# Patient Record
Sex: MALE | NOT HISPANIC OR LATINO | Employment: UNEMPLOYED | URBAN - METROPOLITAN AREA
[De-identification: names, ages, dates, MRNs, and addresses within clinical notes are randomized per-mention and may not be internally consistent; named-entity substitution may affect disease eponyms.]

---

## 2018-10-27 ENCOUNTER — HOSPITAL ENCOUNTER (EMERGENCY)
Facility: CLINIC | Age: 28
Discharge: HOME | End: 2018-10-27
Attending: EMERGENCY MEDICINE | Admitting: EMERGENCY MEDICINE
Payer: COMMERCIAL

## 2018-10-27 VITALS
RESPIRATION RATE: 18 BRPM | DIASTOLIC BLOOD PRESSURE: 117 MMHG | OXYGEN SATURATION: 97 % | SYSTOLIC BLOOD PRESSURE: 160 MMHG

## 2018-10-27 DIAGNOSIS — F19.10 SUBSTANCE ABUSE (H): ICD-10-CM

## 2018-10-27 DIAGNOSIS — F43.22 ADJUSTMENT DISORDER WITH ANXIOUS MOOD: ICD-10-CM

## 2018-10-27 PROCEDURE — 99285 EMERGENCY DEPT VISIT HI MDM: CPT | Mod: 25

## 2018-10-27 PROCEDURE — 90791 PSYCH DIAGNOSTIC EVALUATION: CPT

## 2018-10-27 ASSESSMENT — ENCOUNTER SYMPTOMS: HALLUCINATIONS: 0

## 2018-10-27 NOTE — ED AVS SNAPSHOT
Emergency Department    [address] [facility] [address]    Phone:  [phone]    Fax:  [phone]                                       Rl Tubbs   MRN: [id number]    Department:   Emergency Department   Date of Visit:  10/27/2018           Patient Information     Date Of Birth          1990        Your diagnoses for this visit were:     Adjustment disorder with anxious mood     Substance abuse (H)        You were seen by Miguel Mattson MD.      Follow-up Information     Follow up with  Emergency Department.    Specialty:  EMERGENCY MEDICINE    Why:  As needed    Contact information:    [address] [facility] [address] [phone]        Follow up with Primary Care In 3 days.        Discharge Instructions         Adjustment Disorder  Life changes--work, family, parents, children--each can cause a great deal of stress in life. An adjustment disorder means you have trouble dealing with this change and stress. This problem can have serious results. You may feel helpless, depressed, make bad decisions, or even feel like you want to hurt yourself.  Adjustment disorder can cause anxiety or depression. It is triggered by daily stresses such as:    Death of a loved one    Divorce    Marriage    General life changes such as changing or leaving a job    Moving    Illness or other health issue for you or a family member    Sex    Money     Symptoms may include:    Sadness or crying    Anxiety    Insomnia    Poor concentration    Trouble doing simple things    New problems at work or with family or friends    Loss of self-esteem    Sense of hopelessness    Feeling trapped or cut off from others  With this condition, it is common to feel sad, guilty, hopeless, and restless. These feelings may continue for weeks or months. It can be helpful to identify what is causing the additional stress and take steps to get extra support. If new stressful events do not  happen, it is likely that you will gradually start feeling better.  Home care    If you have been given a prescription for medicine, take it as directed.    It helps to talk about your feelings and thoughts with family or friends who understand and support you.  Follow-up care  Follow up with your healthcare provider, or therapist as advised. Let them know if this condition does not improve or gets worse.  When to seek medical advice  Call your healthcare provider right away if any of these happen:    Worsening depression or anxiety    Feeling out of control    Thoughts of harming yourself or another    Being unable to care for yourself  Date Last Reviewed: 10/1/2017    6089-7158 [employer]. [address] [place] [address]. All rights reserved. This information is not intended as a substitute for professional medical care. Always follow your healthcare professional's instructions.        Drug Abuse  Use and abuse of drugs like marijuana, amphetamines (speed, crank), cocaine, heroin or prescription pain medicines, sedatives and sleeping pills, MDMA, ecstasy, bath salts, PCP, mescaline and LSD may lead to addiction or dependence. Once this happens, you are at greater risk for any of the following:  Social and personal problems    Craving for the drug and not able to stop using even though you think you want to stop (psychological addiction)    Drug withdrawal symptoms if you stop taking the drug (physical dependence)    Loss of job or your family    Arrest, conviction, and [employer] sentence for possession of an illegal substance or for driving under the influence  Health problems    Strokes, heart attacks, kidney failure    Accidental injuries to yourself or others while you are under the influence of the drug (in a car or at home)    HIV infection (much greater risk if you use IV drugs)    Skin infections    Other sexually transmitted disease (STDs such as herpes, chlamydia, gonorrhea, and  others)    Severe and fatal infection of the heart valves (if you use IV drugs)    Hepatitis B or C    Death from overdose  Home care  The following suggestions can help you care for yourself at home:    Admit you have a drug problem. Ask for help from your family and close friends.    Seek professional help. This could be in the form of individual psychotherapy or counseling. There are also outpatient, inpatient, and residential drug treatment programs.    Join a self-help group for drug abuse.    Stay away from friends who abuse drugs or tempt you to continue abusing drugs.    Eat a balanced diet and start a regular exercise program.  Follow-up care  Follow up with your healthcare provider, or as advised. Contact one of the resources below for help:    National [place] on Alcoholism and Drug Dependence  www.ncadd.org  817.386.8680    Narcotics Anonymous  www.na.org  189.764.9155    National Alcohol and Substance Abuse Information Center (for referral to treatment programs)  www.[employer]  191.202.7398  Call 911  Call 911 if any of the following occur:    Seizure    Hard time breathing or slow, irregular breathing    Chest pain    Sudden weakness on one side of your body or sudden trouble speaking    Very drowsy or trouble awakening    Fainting or loss of consciousness    Rapid heart rate    Very slow heart rate  When to seek medical advice  Call your healthcare provider right away if any of these occur:    Agitation, anxiety, unable to sleep    Unintended weight loss (more than 10 to 15 pounds over 3 months)    Fever of 100.4 F (38 C) or higher, or as directed by your healthcare provider    Shortness of breath    Cough with colored sputum    Redness, swelling, or tenderness at an injection site  Date Last Reviewed: 6/1/2016 2000-2017 [employer]. [address] [place] [address]. All rights reserved. This information is not intended as a substitute for professional medical  care. Always follow your healthcare professional's instructions.            24 Hour Appointment Hotline       To make an appointment at any [facility], call [phone] ([phone]). If you don't have a family doctor or clinic, we will help you find one. [facility] clinics are conveniently located to serve the needs of you and your family.             Review of your medicines      Our records show that you are taking the medicines listed below. If these are incorrect, please call your family doctor or clinic.        Dose / Directions Last dose taken    NO ACTIVE MEDICATIONS        .   Refills:  0        SEROQUEL PO        1 TABLET TWICE DAILY   Refills:  0                Orders Needing Specimen Collection     Ordered          10/27/18 2100  Drug screen urine - ROUTINE, Prio: Routine, Needs to be Collected     Scheduled Task Status   10/27/18 2101 Collect Drug screen urine Open   Order Class:  PCU Collect                  Pending Results     No orders found from 10/25/2018 to 10/28/2018.            Pending Culture Results     No orders found from 10/25/2018 to 10/28/2018.            Pending Results Instructions     If you had any lab results that were not finalized at the time of your Discharge, you can call the ED Lab Result RN at [phone]. You will be contacted by this team for any positive Lab results or changes in treatment. The nurses are available 7 days a week from 10A to 6:30P.  You can leave a message 24 hours per day and they will return your call.        Test Results From Your Hospital Stay               Clinical Quality Measure: Blood Pressure Screening     Your blood pressure was checked while you were in the emergency department today. The last reading we obtained was  BP: (!) 160/117 . Please read the guidelines below about what these numbers mean and what you should do about them.  If your systolic blood pressure (the top number) is less than 120 and your diastolic blood pressure (the  "bottom number) is less than 80, then your blood pressure is normal. There is nothing more that you need to do about it.  If your systolic blood pressure (the top number) is 120-139 or your diastolic blood pressure (the bottom number) is 80-89, your blood pressure may be higher than it should be. You should have your blood pressure rechecked within a year by a primary care provider.  If your systolic blood pressure (the top number) is 140 or greater or your diastolic blood pressure (the bottom number) is 90 or greater, you may have high blood pressure. High blood pressure is treatable, but if left untreated over time it can put you at risk for heart attack, stroke, or kidney failure. You should have your blood pressure rechecked by a primary care provider within the next 4 weeks.  If your provider in the emergency department today gave you specific instructions to follow-up with your doctor or provider even sooner than that, you should follow that instruction and not wait for up to 4 weeks for your follow-up visit.        Thank you for choosing [facility]       Thank you for choosing [facility] for your care. Our goal is always to provide you with excellent care. Hearing back from our patients is one way we can continue to improve our services. Please take a few minutes to complete the written survey that you may receive in the mail after you visit with us. Thank you!        [facility] Information     [employer] lets you send messages to your doctor, view your test results, renew your prescriptions, schedule appointments and more. To sign up, go to [email or web address] . Click on \"Log in\" on the left side of the screen, which will take you to the Welcome page. Then click on \"Sign up Now\" on the right side of the page.     You will be asked to enter the access code listed below, as well as some personal information. Please follow the directions to create your username and password.     Your access code is: " [email or web address]  Expires: 2019  9:25 PM     Your access code will  in 90 days. If you need help or a new code, please call your [facility] clinic or [phone].        Care EveryWhere ID     This is your Care EveryWhere ID. This could be used by other organizations to access your [facility] medical records  [id number]        Equal Access to Services     [facility]: Hadii joseph Garcia, waaxda lureeadaha, qaybta kaalmada stephanie, michelle duron . So [facility] [phone].    ATENCIÓN: Si habla español, tiene a charlton disposición servicios gratuitos de asistencia lingüística. Llame al [phone].    We comply with applicable federal civil rights laws and [place] laws. We do not discriminate on the basis of race, color, national origin, age, disability, sex, sexual orientation, or gender identity.            After Visit Summary       This is your record. Keep this with you and show to your community pharmacist(s) and doctor(s) at your next visit.

## 2018-10-27 NOTE — ED AVS SNAPSHOT
Emergency Department    [address] [place] [address]    Phone:  [phone]    Fax:  [phone]                                       Rl Tubbs   MRN: [id number]    Department:   Emergency Department   Date of Visit:  10/27/2018           After Visit Summary Signature Page     I have received my discharge instructions, and my questions have been answered. I have discussed any challenges I see with this plan with the nurse or doctor.    ..........................................................................................................................................  Patient/Patient Representative Signature      ..........................................................................................................................................  Patient Representative Print Name and Relationship to Patient    ..................................................               ................................................  Date                                   Time    ..........................................................................................................................................  Reviewed by Signature/Title    ...................................................              ..............................................  Date                                               Time          22EPIC Rev 08/18

## 2018-10-28 NOTE — ED PROVIDER NOTES
"  History     Chief Complaint:  Psychiatric Evaluation     HPI   Rl Tubbs is a 28 year old male with a history of PTSD and anxiety who presents to the emergency department today for a psychiatric evaluation. Per EMS, the patient is scheduled to go to [facility] and was spending the day with family when he pulled out a pocket knife and threatened to hurt himself, prompting a call to 911. He was placed on a hold by PD and was transported by EMS, who note that the situation was \"a misunderstanding.\" Here the patient endorses having a knife in his pocket but states that he did not take it out. He vaguely describes increasing stressors in his life bringing him to \"his wits end\" and adds that his \"patience meter is gone,\" however he notes that he is unable to articulate the causes of this. The patient states that he does not want to be here as he is hungry and wants to be where he can decompress. He denies any alcohol or drug use, as well as hallucinations or suicidal or homicidal ideation.     Allergies:  No Known Drug Allergies    Medications:    Medications reviewed. No current medications.     Past Medical History:    PTSD  Anxiety    Past Surgical History:    Surgical history reviewed. No pertinent surgical history.    Family History:    Family history reviewed. No pertinent family history.     Social History:  Smoking Status: Never Smoker  Smokeless Tobacco: Never Used  Alcohol Use: Negative  Marital Status:  Single      Review of Systems   Psychiatric/Behavioral: Negative for hallucinations and suicidal ideas (no homicidal).        Stressed   All other systems reviewed and are negative.    Physical Exam     Patient Vitals for the past 24 hrs:   BP Heart Rate Resp SpO2   10/27/18 2044 (!) 160/117 94 18 97 %     Physical Exam  Eyes:  The pupils are equal and round    Conjunctivae and sclerae are normal  ENT:    The nose is normal    Pinnae are normal  CV:  Regular rate and rhythm     No " edema  Resp:  Lungs are clear    Non-labored    No rales    No wheezing   GI:  Abdomen is soft and non-tender, there is no rigidity    No distension  MS:  Normal muscular tone    No asymmetric leg swelling  Skin:  No rash or acute skin lesions noted  Neuro:   Awake, alert.      Speech is normal and fluent.    Face is symmetric.     Moves all extremities  Psych:  Angry, thoughts are goal-directed. Denies SI, denies HI.     Emergency Department Course     Emergency Department Course:    1951 Nursing notes and vitals reviewed.    2013 I performed an exam of the patient as documented above.     2057 DEC  at bedside. Assessment in progress.     2111 I spoke with the DEC  regarding patient's presentation, findings, and plan of care.    2130 I personally answered all related questions prior to discharge.    Impression & Plan      Medical Decision Making:  Rl Tubbs is a 28 year old male who presents to the emergency department with feeling of anger. He was brought here by police after there was a report that he had gotten out of his parents' car and threatened to kill himself. He had a knife at that time. He denies having any suicidal thoughts or having any intent to harm himself. He does endorse that he had a pocket knife on him. He states that he has never tried to harm himself in the past and does not want to do so. He says he has been very stressed. He did eventually discuss his symptoms with the DEC . The DEC  felt that his is appropriately future oriented and safe for discharge. He did use methamphetamine yesterday according to DEC assessment. I think this is contributing to his symptoms. The DEC , Serge, was able to contact the [email or web address] in [place] and the patient was comfortable with plan to follow up there. At this time I do not think he presents a danger to himself. He is discharged to the [facility].     Diagnosis:    ICD-10-CM    1. Adjustment disorder  with anxious mood F43.22    2. Substance abuse (H) F19.10      Disposition:   The patient is discharged to [facility].    Discharge Medications:  No discharge medications.    Scribe Disclosure:  I, Sydni Mckayla, am serving as a scribe at 8:12 PM on 10/27/2018 to document services personally performed by Miguel Mattson MD based on my observations and the provider's statements to me.     EMERGENCY DEPARTMENT       Miguel Mattson MD  10/27/18 2380

## 2018-10-28 NOTE — DISCHARGE INSTRUCTIONS
Adjustment Disorder  Life changes--work, family, parents, children--each can cause a great deal of stress in life. An adjustment disorder means you have trouble dealing with this change and stress. This problem can have serious results. You may feel helpless, depressed, make bad decisions, or even feel like you want to hurt yourself.  Adjustment disorder can cause anxiety or depression. It is triggered by daily stresses such as:    Death of a loved one    Divorce    Marriage    General life changes such as changing or leaving a job    Moving    Illness or other health issue for you or a family member    Sex    Money     Symptoms may include:    Sadness or crying    Anxiety    Insomnia    Poor concentration    Trouble doing simple things    New problems at work or with family or friends    Loss of self-esteem    Sense of hopelessness    Feeling trapped or cut off from others  With this condition, it is common to feel sad, guilty, hopeless, and restless. These feelings may continue for weeks or months. It can be helpful to identify what is causing the additional stress and take steps to get extra support. If new stressful events do not happen, it is likely that you will gradually start feeling better.  Home care    If you have been given a prescription for medicine, take it as directed.    It helps to talk about your feelings and thoughts with family or friends who understand and support you.  Follow-up care  Follow up with your healthcare provider, or therapist as advised. Let them know if this condition does not improve or gets worse.  When to seek medical advice  Call your healthcare provider right away if any of these happen:    Worsening depression or anxiety    Feeling out of control    Thoughts of harming yourself or another    Being unable to care for yourself  Date Last Reviewed: 10/1/2017    1783-7216 [employer]. [address] [facility], [place] [address]. All rights reserved. This  information is not intended as a substitute for professional medical care. Always follow your healthcare professional's instructions.        Drug Abuse  Use and abuse of drugs like marijuana, amphetamines (speed, crank), cocaine, heroin or prescription pain medicines, sedatives and sleeping pills, MDMA, ecstasy, bath salts, PCP, mescaline and LSD may lead to addiction or dependence. Once this happens, you are at greater risk for any of the following:  Social and personal problems    Craving for the drug and not able to stop using even though you think you want to stop (psychological addiction)    Drug withdrawal symptoms if you stop taking the drug (physical dependence)    Loss of job or your family    Arrest, conviction, and senior care sentence for possession of an illegal substance or for driving under the influence  Health problems    Strokes, heart attacks, kidney failure    Accidental injuries to yourself or others while you are under the influence of the drug (in a car or at home)    HIV infection (much greater risk if you use IV drugs)    Skin infections    Other sexually transmitted disease (STDs such as herpes, chlamydia, gonorrhea, and others)    Severe and fatal infection of the heart valves (if you use IV drugs)    Hepatitis B or C    Death from overdose  Home care  The following suggestions can help you care for yourself at home:    Admit you have a drug problem. Ask for help from your family and close friends.    Seek professional help. This could be in the form of individual psychotherapy or counseling. There are also outpatient, inpatient, and residential drug treatment programs.    Join a self-help group for drug abuse.    Stay away from friends who abuse drugs or tempt you to continue abusing drugs.    Eat a balanced diet and start a regular exercise program.  Follow-up care  Follow up with your healthcare provider, or as advised. Contact one of the resources below for help:    National [place] on  Alcoholism and Drug Dependence  www.ncadd.org  253.140.2190    Narcotics Anonymous  www.na.org  447.547.6177    National Alcohol and Substance Abuse Information Center (for referral to treatment programs)  www.[employer]  193.906.1483  Call 911  Call 911 if any of the following occur:    Seizure    Hard time breathing or slow, irregular breathing    Chest pain    Sudden weakness on one side of your body or sudden trouble speaking    Very drowsy or trouble awakening    Fainting or loss of consciousness    Rapid heart rate    Very slow heart rate  When to seek medical advice  Call your healthcare provider right away if any of these occur:    Agitation, anxiety, unable to sleep    Unintended weight loss (more than 10 to 15 pounds over 3 months)    Fever of 100.4 F (38 C) or higher, or as directed by your healthcare provider    Shortness of breath    Cough with colored sputum    Redness, swelling, or tenderness at an injection site  Date Last Reviewed: 6/1/2016 2000-2017 [employer]. [address] [address]. All rights reserved. This information is not intended as a substitute for professional medical care. Always follow your healthcare professional's instructions.

## 2018-10-28 NOTE — ED NOTES
Pt has agreed to talk with ANAT Valentine Pt told to put phone down and answer all questions. Pt states overwhelmed and frustrated with situation

## 2018-10-28 NOTE — ED NOTES
Pt getting discharged to [facility] in [place]. Pt was ordered cab to get to [place] by 2200

## 2019-01-07 ENCOUNTER — HOSPITAL ENCOUNTER (OUTPATIENT)
Dept: BEHAVIORAL HEALTH | Facility: CLINIC | Age: 29
Discharge: HOME | End: 2019-01-07
Attending: SOCIAL WORKER | Admitting: SOCIAL WORKER
Payer: COMMERCIAL

## 2019-01-07 PROCEDURE — H0001 ALCOHOL AND/OR DRUG ASSESS: HCPCS
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
## 2019-01-08 VITALS — HEIGHT: 70 IN | WEIGHT: 190 LBS | BODY MASS INDEX: 27.2 KG/M2

## 2019-01-08 RX ORDER — QUETIAPINE FUMARATE 50 MG/1
TABLET, FILM COATED ORAL
Refills: 2 | COMMUNITY
Start: 2018-12-29

## 2019-01-08 RX ORDER — BUPROPION HYDROCHLORIDE 300 MG/1
TABLET ORAL
Refills: 2 | COMMUNITY
Start: 2018-12-11

## 2019-01-08 ASSESSMENT — ANXIETY QUESTIONNAIRES
2. NOT BEING ABLE TO STOP OR CONTROL WORRYING: NOT AT ALL
5. BEING SO RESTLESS THAT IT IS HARD TO SIT STILL: NOT AT ALL
IF YOU CHECKED OFF ANY PROBLEMS ON THIS QUESTIONNAIRE, HOW DIFFICULT HAVE THESE PROBLEMS MADE IT FOR YOU TO DO YOUR WORK, TAKE CARE OF THINGS AT HOME, OR GET ALONG WITH OTHER PEOPLE: SOMEWHAT DIFFICULT
1. FEELING NERVOUS, ANXIOUS, OR ON EDGE: SEVERAL DAYS
7. FEELING AFRAID AS IF SOMETHING AWFUL MIGHT HAPPEN: NOT AT ALL
3. WORRYING TOO MUCH ABOUT DIFFERENT THINGS: SEVERAL DAYS
GAD7 TOTAL SCORE: 4
6. BECOMING EASILY ANNOYED OR IRRITABLE: SEVERAL DAYS

## 2019-01-08 ASSESSMENT — PAIN SCALES - GENERAL: PAINLEVEL: SEVERE PAIN (6)

## 2019-01-08 ASSESSMENT — MIFFLIN-ST. JEOR: SCORE: 1828.08

## 2019-01-08 ASSESSMENT — PATIENT HEALTH QUESTIONNAIRE - PHQ9
SUM OF ALL RESPONSES TO PHQ QUESTIONS 1-9: 6
5. POOR APPETITE OR OVEREATING: SEVERAL DAYS
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
## 2019-01-09 ASSESSMENT — ANXIETY QUESTIONNAIRES: GAD7 TOTAL SCORE: 4

## 2020-01-15 ENCOUNTER — RECORDS - HEALTHEAST (OUTPATIENT)
Dept: ADMINISTRATIVE | Facility: OTHER | Age: 30
End: 2020-01-15

## 2021-09-05 ENCOUNTER — HOSPITAL ENCOUNTER (EMERGENCY)
Facility: CLINIC | Age: 31
Discharge: HOME | End: 2021-09-05
Attending: EMERGENCY MEDICINE | Admitting: EMERGENCY MEDICINE
Payer: COMMERCIAL

## 2021-09-05 VITALS
HEART RATE: 112 BPM | HEIGHT: 69 IN | TEMPERATURE: 99.1 F | BODY MASS INDEX: 28.14 KG/M2 | WEIGHT: 190 LBS | SYSTOLIC BLOOD PRESSURE: 128 MMHG | RESPIRATION RATE: 20 BRPM | OXYGEN SATURATION: 99 % | DIASTOLIC BLOOD PRESSURE: 90 MMHG

## 2021-09-05 DIAGNOSIS — N48.22 PENILE CELLULITIS: ICD-10-CM

## 2021-09-05 PROCEDURE — 99284 EMERGENCY DEPT VISIT MOD MDM: CPT | Mod: 25

## 2021-09-05 PROCEDURE — 76857 US EXAM PELVIC LIMITED: CPT

## 2021-09-05 PROCEDURE — 250N000013 HC RX MED GY IP 250 OP 250 PS 637: Performed by: EMERGENCY MEDICINE

## 2021-09-05 RX ORDER — SULFAMETHOXAZOLE/TRIMETHOPRIM 800-160 MG
1 TABLET ORAL 2 TIMES DAILY
Qty: 20 TABLET | Refills: 0 | Status: SHIPPED | OUTPATIENT
Start: 2021-09-05

## 2021-09-05 RX ORDER — CEPHALEXIN 500 MG/1
500 CAPSULE ORAL 4 TIMES DAILY
Qty: 28 CAPSULE | Refills: 0 | Status: SHIPPED | OUTPATIENT
Start: 2021-09-05 | End: 2021-09-12

## 2021-09-05 RX ORDER — CEPHALEXIN 500 MG/1
500 CAPSULE ORAL ONCE
Status: COMPLETED | OUTPATIENT
Start: 2021-09-05 | End: 2021-09-05

## 2021-09-05 RX ORDER — SULFAMETHOXAZOLE/TRIMETHOPRIM 800-160 MG
2 TABLET ORAL ONCE
Status: COMPLETED | OUTPATIENT
Start: 2021-09-05 | End: 2021-09-05

## 2021-09-05 RX ADMIN — CEPHALEXIN 500 MG: 500 CAPSULE ORAL at 23:52

## 2021-09-05 RX ADMIN — SULFAMETHOXAZOLE AND TRIMETHOPRIM 2 TABLET: 800; 160 TABLET ORAL at 23:52

## 2021-09-05 ASSESSMENT — MIFFLIN-ST. JEOR: SCORE: 1797.21

## 2021-09-06 NOTE — ED TRIAGE NOTES
Here for concern of mass/lump to right side of penis/groin area since yesterday. Per ems, patient did admit to using meth tonight. ABCs intact.

## 2021-09-06 NOTE — ED PROVIDER NOTES
"  History   Chief Complaint:  Mass       HPI     Rl Tubbs is a 31 year old male with history of methamphetamine abuse who presents with penile lesion.  Yesterday the patient noted a lesion to the left side of the penis.  This area is painful and \"swollen.\"  The pain is relatively constant and aggravated by touch.  There has been no discharge from the area.  He denies any dysuria, urethral discharge, fever, vomiting.  He denies any recent sexual contacts.  He denies any associated trauma.  He does admit to methamphetamine use this morning or last night but is uncertain..    Review of Systems   Constitutional: Negative for fever.   Respiratory: Negative for shortness of breath.    Gastrointestinal: Negative for vomiting.   Skin: Positive for rash.   All other systems reviewed and are negative.    Allergies:  The patient has no known allergies.     Medications:  none    Past Medical History:    Episodic mood disorder  Substance abuse    Depression   ADHD     Past Surgical History:    Tonsillectomy     Family History:    Father - alcohol/drug  Mother - alcohol/drug    Social History:  Methamphetamine abuse    Physical Exam     Patient Vitals for the past 24 hrs:   BP Temp Temp src Pulse Resp SpO2 Height Weight   09/05/21 2229 (!) 131/98 -- -- -- -- -- -- --   09/05/21 2227 -- 99.1  F (37.3  C) Temporal 120 18 100 % 1.753 m (5' 9\") 86.2 kg (190 lb)       Physical Exam    Eyes:    Conjunctiva normal  Neck:    Supple, no meningismus.     CV:     Regular rate and rhythm.      No murmurs, rubs or gallops.         No lower extremity edema.  PULM:    Clear to auscultation bilateral.       No respiratory distress.   ABD:    Soft, non-distended.       No abdominal tenderness.     No rebound, guarding or rigidity.  :   Gene stage V male, circumcised genitalia.     Left inguinal lymphadenopathy.     No inguinal hernia.     No urethral discharge.     Testes in a normal position/lie.     No testicular tenderness.     No " epididymal tenderness.     8 mm nodule to left cephalad portion of the penis at its base      No ulceration or discharge      Adjacent induration and tenderness  MSK:     No gross deformity to all four extremities.   LYMPH:   No cervical lymphadenopathy.  NEURO:   Alert.  Good muscular tone, no atrophy.   Skin:    Warm, dry  Psych:    Mood is good and affect is appropriate.      Emergency Department Course       Procedures:  [facility] Procedure Note     Limited Bedside ED Ultrasound of Soft Tissue:     PROCEDURE: PERFORMED BY: Dr. Ken Olvera MD  INDICATIONS/SYMPTOM: Skin redness, evaluate for abscess, cellulitis or foreign body  PROBE: High frequency linear probe  BODY LOCATION: Soft tissue located on penis     FINDINGS:    Hypoechoic fluid (ie abscess) identified: absent      INTERPRETATION:  The soft tissue and muscle layers were evaluated.      Findings indicate cellulitis    IMAGE DOCUMENTATION: Images were archived to hard drive.      Emergency Department Course:    Reviewed:  I reviewed nursing notes, vitals, past medical history and care everywhere    Assessments:  I obtained history and examined the patient as noted above.   I rechecked the patient and explained findings.     Interventions:  Medications   sulfamethoxazole-trimethoprim (BACTRIM DS) 800-160 MG per tablet 2 tablet (has no administration in time range)   cephALEXin (KEFLEX) capsule 500 mg (has no administration in time range)       Disposition:  The patient was discharged to home.       Impression & Plan   CMS Diagnoses: None    Medical Decision Makin-year-old male presented to the ED with penile lesion.  This is less suggestive of STI especially given lack of recent novel sexual contacts.  Very low suspicion for primary syphilis, chancroid or lymphogranuloma venereum.  I believe this most likely represents cellulitis without abscess.  Bedside ultrasound has ruled out underlying abscess.  Patient initiated on  cephalexin and Bactrim.  He will be discharged home with the same.  Close follow-up with primary care physician and return to the ED for any worsening symptoms.      Diagnosis:    ICD-10-CM    1. Penile cellulitis  N48.22        Discharge Medications:  New Prescriptions    CEPHALEXIN (KEFLEX) 500 MG CAPSULE    Take 1 capsule (500 mg) by mouth 4 times daily for 7 days    SULFAMETHOXAZOLE-TRIMETHOPRIM (BACTRIM DS) 800-160 MG TABLET    Take 1 tablet by mouth 2 times daily       Scribe Disclosure:  I, Liam uH, am serving as a scribe at 11:17 PM on 9/5/2021 to document services personally performed by Ken Olvera MD based on my observations and the provider's statements to me.            Ken Olvera MD  09/07/21 [id number]

## 2021-09-07 ASSESSMENT — ENCOUNTER SYMPTOMS
FEVER: 0
VOMITING: 0
SHORTNESS OF BREATH: 0

## 2024-10-27 ENCOUNTER — HOSPITAL ENCOUNTER (EMERGENCY)
Facility: CLINIC | Age: 34
Discharge: HOME | End: 2024-10-27
Attending: EMERGENCY MEDICINE | Admitting: EMERGENCY MEDICINE
Payer: COMMERCIAL

## 2024-10-27 ENCOUNTER — APPOINTMENT (OUTPATIENT)
Dept: GENERAL RADIOLOGY | Facility: CLINIC | Age: 34
End: 2024-10-27
Attending: EMERGENCY MEDICINE
Payer: COMMERCIAL

## 2024-10-27 VITALS
HEIGHT: 69 IN | BODY MASS INDEX: 23.7 KG/M2 | SYSTOLIC BLOOD PRESSURE: 139 MMHG | OXYGEN SATURATION: 99 % | DIASTOLIC BLOOD PRESSURE: 104 MMHG | HEART RATE: 55 BPM | TEMPERATURE: 99 F | WEIGHT: 160 LBS | RESPIRATION RATE: 18 BRPM

## 2024-10-27 DIAGNOSIS — F15.10 METHAMPHETAMINE ABUSE (H): ICD-10-CM

## 2024-10-27 PROCEDURE — 73562 X-RAY EXAM OF KNEE 3: CPT | Mod: RT

## 2024-10-27 PROCEDURE — 99283 EMERGENCY DEPT VISIT LOW MDM: CPT

## 2024-10-27 RX ORDER — BUPROPION HYDROCHLORIDE 300 MG/1
300 TABLET ORAL EVERY MORNING
Qty: 30 TABLET | Refills: 0 | Status: SHIPPED | OUTPATIENT
Start: 2024-10-27 | End: 2024-11-26

## 2024-10-27 RX ORDER — QUETIAPINE FUMARATE 50 MG/1
50 TABLET, FILM COATED ORAL AT BEDTIME
Qty: 30 TABLET | Refills: 0 | Status: SHIPPED | OUTPATIENT
Start: 2024-10-27 | End: 2024-11-26

## 2024-10-27 ASSESSMENT — ACTIVITIES OF DAILY LIVING (ADL)
ADLS_ACUITY_SCORE: 0
ADLS_ACUITY_SCORE: 0

## 2024-10-27 NOTE — ED NOTES
Video Observation initiated, patient informed.     Flaca Posadas RN      Pt searched by security and this writer, all belongings removed and placed in pt locker. Pt was found with a capped needle, which was disposed of.

## 2024-10-27 NOTE — ED NOTES
Writer called [place] PD to inform them that we are discharging the patient, he is medically cleared  but since he was a missing person. The person on the phone said she will notify [facility] and inform them the patient is here and will call us back to inform us of the plan of care. The [place] PD office stated that the patient has an active commitment orders but does not know the plan of care and will let us know. MD notified of Commitment hold .

## 2024-10-27 NOTE — ED PROVIDER NOTES
Emergency Department Note      History of Present Illness     Chief Complaint   Psychiatric Evaluation      HPI   Rl Tubbs is a 34 year old male with a history of methamphetamine abuse disorder presenting to the ED for psychiatric evaluation. The patient reports that he was waiting inside the [employer] [facility] for the busses to start running, and simply wanted to get out of the cold. He adds that he has ran out of his medications, and is requesting a refill. He is also endorsing right lateral knee pain that has made it difficult to walk. No recent injury to the knee. No hallucinations, suicidal or homicidal ideation. The patient denies any methamphetamine use today.     Note, a used needle was found on the patient's person upon his arrival in the ED.     Independent Historian   None    Review of External Notes   He was seen in the [facility] system on 9/25/24. He was admitted and discharged on 9/28/24. He has a history of polysubstance abuse. He came in with swelling in his neck, and had an ultrasound guided I&D, with progressive swelling, redness, and facial edema. He was also seen in June of this year for a right thumb infection. He was treated with IV antibiotics, and eventually discharged on doxycycline.     He was also seen at [facility] in April of this year for cellulitis of his third finger and was treated with vancomycin and ceftriaxone.     Today, the patient was placed on a  Hold by [place] [facility]. They note that he appears mentally ill, and had some recent pertinent hospitalization. No force was required. They were dispatched to the [facility] in [place] for reports of a man knocking on doors stating he was a member of the staff. PD ran checks discovering that the patient was identified as a missing person. The missing person information noted chemical dependency and mental illness.     Past Medical History     Medical History and Problem List   MRSA  Cellulitis   Episodic  "mood disorder   Methamphetamine abuse disorder  Psychosis  ADHD  Heart valve disease  Bipolar I disorder     Medications   Wellbutrin   Seroquel     Physical Exam     Patient Vitals for the past 24 hrs:   BP Temp Temp src Pulse Resp SpO2 Height Weight   10/27/24 0719 (!) 139/104 99  F (37.2  C) Oral 55 18 99 % 1.753 m (5' 9\") 72.6 kg (160 lb)     Physical Exam  General: Resting comfortably. Falls asleep during interview. He is aware of his location.   Head:  The scalp, face, and head appear normal  Eyes:  The pupils are equal, round, and reactive to light    There is no nystagmus    Extraocular muscles are intact    Conjunctivae and sclerae are normal  ENT:    The nose is normal    Pinnae are normal    The oropharynx is normal  Neck:  There is a completely healed incision to the left superior neck from an abscess I&D.  Normal range of motion    There is no rigidity noted  CV:  Regular rate  Normal underlying rhythm     Normal S1/S2    No pathological murmur detected  Resp:  Lungs are clear    There is no tachypnea    Non-labored    No rales    No wheezing   GI:  Abdomen is soft, there is no rigidity    No distension/tympani    No rebound tenderness     Non-surgical without peritoneal features at this time  MS:  Right leg: There is no obvious abnormality involving the thigh, shin, or ankle. The knee is mildly tender involving the LCL region. There is no clinical effusion. The knee is not red or warm. There is no obvious external infection to the knee.   Left leg: The left knee appears similar to the right except is not tender laterally.    Normal muscular tone    Symmetric motor strength    No major joint effusions    No asymmetric leg swelling    No calf tenderness  Skin:  There is a healing burn to the right ulnar palm that is not infected.    No rash or acute skin lesions noted  Neuro:  Speech is normal and fluent, there is no aphasia    No motor deficits    Cranial nerves are intact  Psych: Awake. Alert    Normal " affect    No suicidal ideation    No homicidal ideation    No visual hallucinations    No auditory hallucinations    No delusions    No manic features    Diagnostics     Lab Results   Labs Ordered and Resulted from Time of ED Arrival to Time of ED Departure - No data to display    Imaging   XR Knee Right 3 Views   Preliminary Result   IMPRESSION: Anatomic alignment right knee. No acute displaced right knee fracture. Normal joint spaces. Tiny right knee joint effusion.          Report per radiology.     Independent Interpretation   X-ray knee shows no fracture. Trace suprapatellar effusion.     ED Course      Medications Administered   Medications - No data to display    Procedures   Procedures     Discussion of Management   None    ED Course   ED Course as of 10/27/24 0828   Sun Oct 27, 2024   0734 I obtained history and examined the patient as noted above.     0825 I rechecked the patient and explained findings.  Patient is sitting up eating breakfast and is comfortable with discharge.     Additional Documentation  None    Medical Decision Making / Diagnosis     CMS Diagnoses: None    MIPS       None    MDM   Rl Tubbs is a 34 year old male presents to the emergency department on a  hold, for evaluation in the emergency department.  The patient was in a motel trying to get warm, but was also knocking on some doors acting as if he were staff.  Patient notes to the office that he was trying to warm up given the cool morning temperatures and that he was planning on waiting for the bus service to start running.  Patient does have a history of complex polysubstance abuse, mainly with methamphetamine.  He does have a history of cutaneous skin abscesses in the past in the left neck and to his fingers.  He does use injection drugs, largely methamphetamine.  Patient denies fevers, chills, sweats, and any skin infections at this time.  The patient was observed in the emergency department, he was awake and  alert and appropriate.  He did not have any acute psychotic features.  He ate breakfast in the emergency department and noted that he is currently off his medications.  I offered to refill his Wellbutrin and Seroquel and he noted that this was a reasonable idea and that he would fill them.  The patient noted that he had some mild right lateral knee discomfort.  He was thinking that maybe he had some kind of a sprain.  He does have some tenderness involving the lateral collateral ligament.  There is no abscess or signs of infection in the joint at this time.  Both of the knees are the same temperature.  There is no clinical effusion to either knee.  There is no erythema to the knees.  Also in the differential diagnosis could be a occult joint infection from injection drug use but given the benign appearance of the knee and the focal tenderness out laterally, arthrocentesis will not be performed at this juncture.  If the patient develops increasing knee swelling, pain, fevers, chills, or redness to the knee, he can return to the emergency department for arthrocentesis.  More than likely, the patient has suffered a lateral collateral ligament sprain.  The patient does not require any additional workup for his behavior overnight.  He does use methamphetamine and did have a used needle found on him.  There is no skin based abscesses or other signs of infection that need to be dealt with on this visit.    Of note, we did learn that the patient has an order for commitment.  The nurse took the opportunity to call the police department and the County, who noted that they were fine with the patient's discharge from the emergency department so long that he was medically stable, which he is.  Therefore, the patient will be discharged with his prescriptions.    Disposition   The patient was discharged.     Diagnosis     ICD-10-CM    1. Methamphetamine abuse (H)  F15.10       2.  Right lateral knee sprain    Discharge Medications    New Prescriptions    BUPROPION (WELLBUTRIN XL) 300 MG 24 HR TABLET    Take 1 tablet (300 mg) by mouth every morning.    QUETIAPINE (SEROQUEL) 50 MG TABLET    Take 1 tablet (50 mg) by mouth at bedtime.     Scribe Disclosure:  I, Tracie Drake, am serving as a scribe at 7:34 AM on 10/27/2024 to document services personally performed by Luigi De Leon MD based on my observations and the provider's statements to me.        Luigi De Leon MD  10/27/24 0833       Luigi De Leon MD  10/27/24 0918

## 2024-10-27 NOTE — ED TRIAGE NOTES
Pt brought by EMS from [facility] where PD was called d/t the pt knocking on people's doors saying he was staff. He did not divulge much information to PD or EMS however his name came up in the database as a missing person with psych and chemical dependency. Placed on hold and brought to ED.

## 2024-10-27 NOTE — DISCHARGE INSTRUCTIONS
Please stop using methamphetamine.  Seek assistance for chemical dependency assessment.  I have refilled your Seroquel and Wellbutrin.  Take these as directed.    If you develop increasing right knee swelling, redness, pain, fevers, chills, or sweats, please return to the emergency department for consideration of a needle joint aspiration to the right knee to make sure that you are not developing an infection in the knee, which is possible when you are in intravenous drug user.